# Patient Record
Sex: FEMALE | Race: WHITE | Employment: OTHER | URBAN - METROPOLITAN AREA
[De-identification: names, ages, dates, MRNs, and addresses within clinical notes are randomized per-mention and may not be internally consistent; named-entity substitution may affect disease eponyms.]

---

## 2021-08-26 ENCOUNTER — OFFICE VISIT (OUTPATIENT)
Dept: FAMILY MEDICINE CLINIC | Age: 61
End: 2021-08-26
Payer: COMMERCIAL

## 2021-08-26 VITALS
SYSTOLIC BLOOD PRESSURE: 119 MMHG | DIASTOLIC BLOOD PRESSURE: 81 MMHG | HEART RATE: 85 BPM | WEIGHT: 209.2 LBS | TEMPERATURE: 98.8 F | HEIGHT: 66 IN | BODY MASS INDEX: 33.62 KG/M2 | OXYGEN SATURATION: 96 %

## 2021-08-26 DIAGNOSIS — E55.9 VITAMIN D DEFICIENCY: ICD-10-CM

## 2021-08-26 DIAGNOSIS — R26.9 GAIT DIFFICULTY: ICD-10-CM

## 2021-08-26 DIAGNOSIS — Z12.31 SCREENING MAMMOGRAM, ENCOUNTER FOR: ICD-10-CM

## 2021-08-26 DIAGNOSIS — G89.29 CHRONIC LOW BACK PAIN, UNSPECIFIED BACK PAIN LATERALITY, UNSPECIFIED WHETHER SCIATICA PRESENT: Primary | ICD-10-CM

## 2021-08-26 DIAGNOSIS — Z13.29 SCREENING FOR THYROID DISORDER: ICD-10-CM

## 2021-08-26 DIAGNOSIS — Z11.59 NEED FOR HEPATITIS C SCREENING TEST: ICD-10-CM

## 2021-08-26 DIAGNOSIS — M54.50 CHRONIC LOW BACK PAIN, UNSPECIFIED BACK PAIN LATERALITY, UNSPECIFIED WHETHER SCIATICA PRESENT: Primary | ICD-10-CM

## 2021-08-26 DIAGNOSIS — R29.818 DIFFICULTY BALANCING: ICD-10-CM

## 2021-08-26 DIAGNOSIS — E53.9 VITAMIN B DEFICIENCY: ICD-10-CM

## 2021-08-26 DIAGNOSIS — Z11.4 ENCOUNTER FOR SCREENING FOR HIV: ICD-10-CM

## 2021-08-26 DIAGNOSIS — Z12.4 PAP SMEAR FOR CERVICAL CANCER SCREENING: ICD-10-CM

## 2021-08-26 DIAGNOSIS — R73.9 HYPERGLYCEMIA: ICD-10-CM

## 2021-08-26 DIAGNOSIS — E66.09 CLASS 1 OBESITY DUE TO EXCESS CALORIES WITH BODY MASS INDEX (BMI) OF 33.0 TO 33.9 IN ADULT, UNSPECIFIED WHETHER SERIOUS COMORBIDITY PRESENT: ICD-10-CM

## 2021-08-26 DIAGNOSIS — Z13.220 SCREENING, LIPID: ICD-10-CM

## 2021-08-26 PROCEDURE — 99204 OFFICE O/P NEW MOD 45 MIN: CPT | Performed by: FAMILY MEDICINE

## 2021-08-26 RX ORDER — HYDROCODONE BITARTRATE AND ACETAMINOPHEN 10; 325 MG/1; MG/1
1 TABLET ORAL EVERY 6 HOURS PRN
COMMUNITY
End: 2021-09-23

## 2021-08-26 RX ORDER — PREGABALIN 100 MG/1
100 CAPSULE ORAL 2 TIMES DAILY
COMMUNITY
End: 2021-09-21

## 2021-08-26 SDOH — ECONOMIC STABILITY: FOOD INSECURITY: WITHIN THE PAST 12 MONTHS, THE FOOD YOU BOUGHT JUST DIDN'T LAST AND YOU DIDN'T HAVE MONEY TO GET MORE.: NEVER TRUE

## 2021-08-26 SDOH — ECONOMIC STABILITY: FOOD INSECURITY: WITHIN THE PAST 12 MONTHS, YOU WORRIED THAT YOUR FOOD WOULD RUN OUT BEFORE YOU GOT MONEY TO BUY MORE.: NEVER TRUE

## 2021-08-26 ASSESSMENT — ENCOUNTER SYMPTOMS
DIARRHEA: 0
ANAL BLEEDING: 0
EYE DISCHARGE: 0
EYE REDNESS: 0
CHEST TIGHTNESS: 0
VOMITING: 0
ABDOMINAL DISTENTION: 0
VOICE CHANGE: 0
COLOR CHANGE: 0
SINUS PRESSURE: 0
CONSTIPATION: 0
TROUBLE SWALLOWING: 0
SHORTNESS OF BREATH: 0
BACK PAIN: 1
NAUSEA: 0
COUGH: 0
EYE PAIN: 0
RECTAL PAIN: 0
BLOOD IN STOOL: 0
ABDOMINAL PAIN: 0

## 2021-08-26 ASSESSMENT — PATIENT HEALTH QUESTIONNAIRE - PHQ9
SUM OF ALL RESPONSES TO PHQ QUESTIONS 1-9: 1
1. LITTLE INTEREST OR PLEASURE IN DOING THINGS: 0
SUM OF ALL RESPONSES TO PHQ QUESTIONS 1-9: 0
SUM OF ALL RESPONSES TO PHQ QUESTIONS 1-9: 1
1. LITTLE INTEREST OR PLEASURE IN DOING THINGS: 0
2. FEELING DOWN, DEPRESSED OR HOPELESS: 1
SUM OF ALL RESPONSES TO PHQ QUESTIONS 1-9: 0
SUM OF ALL RESPONSES TO PHQ9 QUESTIONS 1 & 2: 1
SUM OF ALL RESPONSES TO PHQ9 QUESTIONS 1 & 2: 0
SUM OF ALL RESPONSES TO PHQ QUESTIONS 1-9: 1
2. FEELING DOWN, DEPRESSED OR HOPELESS: 0
SUM OF ALL RESPONSES TO PHQ QUESTIONS 1-9: 0

## 2021-08-26 ASSESSMENT — SOCIAL DETERMINANTS OF HEALTH (SDOH): HOW HARD IS IT FOR YOU TO PAY FOR THE VERY BASICS LIKE FOOD, HOUSING, MEDICAL CARE, AND HEATING?: SOMEWHAT HARD

## 2021-08-26 NOTE — PROGRESS NOTES
Subjective:      Patient ID: Jose Raul Mcdowell is a 62 y.o. female. Had H/O back surgery - fusion was doing well, last year had a repeat surgery last year for chronic pain   due to worsening stenosis for her back in [place],   since then has had diff walking, incontinence of bladder Hailee at night, swelling in her legs, was seeing   Pain management and was prescribing her norco that she will run out of soon. States she has been having diff with gait and balance since her surgery - was using a walker   Initially but as she noticed increased diff with balance she started using a walker - has been using a walker   For over 1 year. Last fall was 2 weeks ago. States she has had falls at least once a month, usually goes down on one  Knee, usually walker gets in front of her and when she is tired she just drops. This last week she fell back wards. Mood has been ok, states family is supportive, older brother is here with you today. Appetite has been good . She states sleep has been ok as well. Bowels are regular. States her thyroid was checked a few times and a biopsy was done - was ok. 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 Had colonoscopy approx 5 years ago and was wNL  Review of Systems   Constitutional: Positive for activity change. Negative for appetite change and fatigue. HENT: Negative for dental problem, ear pain, hearing loss, postnasal drip, sinus pressure, sneezing, tinnitus, trouble swallowing and voice change. Eyes: Negative for pain, discharge, redness and visual disturbance. Respiratory: Negative for cough, chest tightness and shortness of breath. Cardiovascular: Negative for chest pain, palpitations and leg swelling. Gastrointestinal: Negative for abdominal distention, abdominal pain, anal bleeding, blood in stool, constipation, diarrhea, nausea, rectal pain and vomiting. Endocrine: Negative for cold intolerance, heat intolerance, polydipsia, polyphagia and polyuria.    Genitourinary: Negative for decreased urine volume, difficulty urinating, dyspareunia, dysuria, enuresis, flank pain, frequency, genital sores, hematuria, menstrual problem, pelvic pain, urgency, vaginal bleeding and vaginal discharge. Urinary incontinence     Musculoskeletal: Positive for back pain, gait problem and myalgias. Negative for arthralgias, joint swelling, neck pain and neck stiffness. Skin: Negative for color change, pallor and rash. Allergic/Immunologic: Negative for environmental allergies, food allergies and immunocompromised state. Neurological: Negative for dizziness, tremors, seizures, syncope, facial asymmetry, speech difficulty, weakness, light-headedness, numbness and headaches. Hematological: Negative for adenopathy. Does not bruise/bleed easily. Psychiatric/Behavioral: Negative for agitation, behavioral problems, confusion, decreased concentration, sleep disturbance and suicidal ideas. The patient is not nervous/anxious. Objective:   Physical Exam  Constitutional:       General: She is not in acute distress. Appearance: She is well-developed. HENT:      Head: Normocephalic and atraumatic. Right Ear: External ear normal.      Left Ear: External ear normal.   Eyes:      Conjunctiva/sclera: Conjunctivae normal.      Pupils: Pupils are equal, round, and reactive to light. Cardiovascular:      Rate and Rhythm: Normal rate and regular rhythm. Heart sounds: Normal heart sounds. Pulmonary:      Effort: Pulmonary effort is normal.      Breath sounds: Normal breath sounds. Abdominal:      General: Bowel sounds are normal. There is no distension. Palpations: Abdomen is soft. Tenderness: There is no abdominal tenderness. Musculoskeletal:      Cervical back: Normal range of motion and neck supple. Comments: Diff with gait- uses a walker for ambulation. Skin:     General: Skin is warm and dry.    Neurological:      Mental Status: She is alert and oriented to person, place, and time. Psychiatric:         Behavior: Behavior normal.         Thought Content: Thought content normal.         Judgment: Judgment normal.      B/ LE edema    Vitals:    08/26/21 1501   BP: 119/81   Pulse: 85   Temp: 98.8 °F (37.1 °C)   SpO2: 96%         Assessment:      Diagnosis Orders   1. Chronic low back pain, unspecified back pain laterality, unspecified whether sciatica present  [facility]   2. Gait difficulty  [facility] [place]   3. Difficulty balancing  [facility] [place]   4. Screening for thyroid disorder  T4, Free    TSH without Reflex   5. Screening, lipid  Comprehensive Metabolic Panel    Lipid Panel   6. Vitamin D deficiency  Vitamin D 25 Hydroxy   7. Vitamin B deficiency  CBC    Folate    Vitamin B12   8. Need for hepatitis C screening test  Hepatitis C Antibody   9. Hyperglycemia  Hemoglobin A1C   10. Encounter for screening for HIV  HIV Screen   11. Class 1 obesity due to excess calories with body mass index (BMI) of 33.0 to 33.9 in adult, unspecified whether serious comorbidity present  Insulin, Free   12. Pap smear for cervical cancer screening  [facility], Yang Morales CNM, OB/GYN, [place]   13. Screening mammogram, encounter for  SUZETTE DIGITAL SCREEN W OR WO CAD BILATERAL         Plan:     Orders Placed This Encounter   Procedures    SUZETTE DIGITAL SCREEN W OR WO CAD BILATERAL    CBC    Comprehensive Metabolic Panel    Folate    Hemoglobin A1C    Hepatitis C Antibody    HIV Screen    Lipid Panel    T4, Free    TSH without Reflex    Vitamin B12    Vitamin D 25 Hydroxy    Insulin, Free    [facility]   [facility], Yang Morales CNM, OB/GYN, [place]       Outpatient Encounter Medications as of 8/26/2021   Medication Sig Dispense Refill    HYDROcodone-acetaminophen (NORCO)  MG per tablet Take 1 tablet by mouth every 6 hours as needed for Pain.       pregabalin (LYRICA) 100 MG capsule Take 100 mg by mouth 2 times daily. No facility-administered encounter medications on file as of 8/26/2021.     15 minutes spent with patient counseling/educating on acute/chronic medical health problems, medications,  along with treatment options. Reviewed multiple labs/imaging/medications with patient during this time. Following Diet discussion/education was done on Diabetic Diet. In addition Exercise plan and depression screen were discussed. Recent labs/imaging were discussed and reviewed with patient.

## 2021-08-28 ENCOUNTER — HOSPITAL ENCOUNTER (OUTPATIENT)
Facility: CLINIC | Age: 61
Discharge: HOME | End: 2021-08-28
Payer: COMMERCIAL

## 2021-08-28 DIAGNOSIS — Z11.59 NEED FOR HEPATITIS C SCREENING TEST: ICD-10-CM

## 2021-08-28 DIAGNOSIS — Z11.4 ENCOUNTER FOR SCREENING FOR HIV: ICD-10-CM

## 2021-08-28 DIAGNOSIS — E66.09 CLASS 1 OBESITY DUE TO EXCESS CALORIES WITH BODY MASS INDEX (BMI) OF 33.0 TO 33.9 IN ADULT, UNSPECIFIED WHETHER SERIOUS COMORBIDITY PRESENT: ICD-10-CM

## 2021-08-28 DIAGNOSIS — E55.9 VITAMIN D DEFICIENCY: ICD-10-CM

## 2021-08-28 DIAGNOSIS — Z13.220 SCREENING, LIPID: ICD-10-CM

## 2021-08-28 DIAGNOSIS — R73.9 HYPERGLYCEMIA: ICD-10-CM

## 2021-08-28 DIAGNOSIS — Z13.29 SCREENING FOR THYROID DISORDER: ICD-10-CM

## 2021-08-28 DIAGNOSIS — E53.9 VITAMIN B DEFICIENCY: ICD-10-CM

## 2021-08-28 LAB
ALBUMIN SERPL-MCNC: 4.5 G/DL (ref 3.5–5.2)
ALBUMIN/GLOBULIN RATIO: 1.7 (ref 1–2.5)
ALP BLD-CCNC: 64 U/L (ref 35–104)
ALT SERPL-CCNC: 15 U/L (ref 5–33)
ANION GAP SERPL CALCULATED.3IONS-SCNC: 15 MMOL/L (ref 9–17)
AST SERPL-CCNC: 15 U/L
BILIRUB SERPL-MCNC: 0.36 MG/DL (ref 0.3–1.2)
BUN BLDV-MCNC: 12 MG/DL (ref 8–23)
BUN/CREAT BLD: ABNORMAL (ref 9–20)
CALCIUM SERPL-MCNC: 9.5 MG/DL (ref 8.6–10.4)
CHLORIDE BLD-SCNC: 104 MMOL/L (ref 98–107)
CHOLESTEROL/HDL RATIO: 5.1
CHOLESTEROL: 226 MG/DL
CO2: 23 MMOL/L (ref 20–31)
CREAT SERPL-MCNC: 0.51 MG/DL (ref 0.5–0.9)
FOLATE: 17.1 NG/ML
GFR AFRICAN AMERICAN: >60 ML/MIN
GFR NON-AFRICAN AMERICAN: >60 ML/MIN
GFR SERPL CREATININE-BSD FRML MDRD: ABNORMAL ML/MIN/{1.73_M2}
GFR SERPL CREATININE-BSD FRML MDRD: ABNORMAL ML/MIN/{1.73_M2}
GLUCOSE BLD-MCNC: 103 MG/DL (ref 70–99)
HCT VFR BLD CALC: 43.1 % (ref 36.3–47.1)
HDLC SERPL-MCNC: 44 MG/DL
HEMOGLOBIN: 13.8 G/DL (ref 11.9–15.1)
HEPATITIS C ANTIBODY: NONREACTIVE
HIV AG/AB: NONREACTIVE
LDL CHOLESTEROL: 123 MG/DL (ref 0–130)
MCH RBC QN AUTO: 29.4 PG (ref 25.2–33.5)
MCHC RBC AUTO-ENTMCNC: 32 G/DL (ref 28.4–34.8)
MCV RBC AUTO: 91.7 FL (ref 82.6–102.9)
NRBC AUTOMATED: 0 PER 100 WBC
PDW BLD-RTO: 12.7 % (ref 11.8–14.4)
PLATELET # BLD: 298 K/UL (ref 138–453)
PMV BLD AUTO: 10.3 FL (ref 8.1–13.5)
POTASSIUM SERPL-SCNC: 4.5 MMOL/L (ref 3.7–5.3)
RBC # BLD: 4.7 M/UL (ref 3.95–5.11)
SODIUM BLD-SCNC: 142 MMOL/L (ref 135–144)
THYROXINE, FREE: 1.18 NG/DL (ref 0.93–1.7)
TOTAL PROTEIN: 7.2 G/DL (ref 6.4–8.3)
TRIGL SERPL-MCNC: 293 MG/DL
TSH SERPL DL<=0.05 MIU/L-ACNC: 1.03 MIU/L (ref 0.3–5)
VITAMIN B-12: 313 PG/ML (ref 232–1245)
VITAMIN D 25-HYDROXY: 29.9 NG/ML (ref 30–100)
VLDLC SERPL CALC-MCNC: ABNORMAL MG/DL (ref 1–30)
WBC # BLD: 7 K/UL (ref 3.5–11.3)

## 2021-08-28 PROCEDURE — 82746 ASSAY OF FOLIC ACID SERUM: CPT

## 2021-08-28 PROCEDURE — 83036 HEMOGLOBIN GLYCOSYLATED A1C: CPT

## 2021-08-28 PROCEDURE — 82306 VITAMIN D 25 HYDROXY: CPT

## 2021-08-28 PROCEDURE — 80061 LIPID PANEL: CPT

## 2021-08-28 PROCEDURE — 84439 ASSAY OF FREE THYROXINE: CPT

## 2021-08-28 PROCEDURE — 82607 VITAMIN B-12: CPT

## 2021-08-28 PROCEDURE — 83527 ASSAY OF INSULIN: CPT

## 2021-08-28 PROCEDURE — 85027 COMPLETE CBC AUTOMATED: CPT

## 2021-08-28 PROCEDURE — 80053 COMPREHEN METABOLIC PANEL: CPT

## 2021-08-28 PROCEDURE — 84443 ASSAY THYROID STIM HORMONE: CPT

## 2021-08-28 PROCEDURE — 86803 HEPATITIS C AB TEST: CPT

## 2021-08-28 PROCEDURE — 36415 COLL VENOUS BLD VENIPUNCTURE: CPT

## 2021-08-28 PROCEDURE — 83525 ASSAY OF INSULIN: CPT

## 2021-08-28 PROCEDURE — 87389 HIV-1 AG W/HIV-1&-2 AB AG IA: CPT

## 2021-08-29 LAB
ESTIMATED AVERAGE GLUCOSE: 114 MG/DL
HBA1C MFR BLD: 5.6 % (ref 4–6)

## 2021-08-31 LAB
INSULIN FREE: 16 UIU/ML (ref 3–25)
INSULIN: 21 UIU/ML (ref 3–25)

## 2021-09-08 ENCOUNTER — INITIAL CONSULT (OUTPATIENT)
Dept: PAIN MANAGEMENT | Age: 61
End: 2021-09-08
Payer: COMMERCIAL

## 2021-09-08 VITALS
HEART RATE: 90 BPM | DIASTOLIC BLOOD PRESSURE: 75 MMHG | WEIGHT: 208 LBS | OXYGEN SATURATION: 94 % | SYSTOLIC BLOOD PRESSURE: 112 MMHG | HEIGHT: 68 IN | BODY MASS INDEX: 31.52 KG/M2

## 2021-09-08 DIAGNOSIS — R29.6 FREQUENT FALLS: ICD-10-CM

## 2021-09-08 DIAGNOSIS — M54.42 CHRONIC BILATERAL LOW BACK PAIN WITH LEFT-SIDED SCIATICA: Primary | ICD-10-CM

## 2021-09-08 DIAGNOSIS — R32 URINARY INCONTINENCE, UNSPECIFIED TYPE: ICD-10-CM

## 2021-09-08 DIAGNOSIS — Z98.1 HISTORY OF LUMBAR FUSION: ICD-10-CM

## 2021-09-08 DIAGNOSIS — R26.81 GAIT INSTABILITY: ICD-10-CM

## 2021-09-08 DIAGNOSIS — Z79.891 CHRONIC PRESCRIPTION OPIATE USE: ICD-10-CM

## 2021-09-08 DIAGNOSIS — G89.29 CHRONIC BILATERAL LOW BACK PAIN WITH LEFT-SIDED SCIATICA: Primary | ICD-10-CM

## 2021-09-08 PROCEDURE — 99244 OFF/OP CNSLTJ NEW/EST MOD 40: CPT | Performed by: ANESTHESIOLOGY

## 2021-09-08 RX ORDER — PREGABALIN 100 MG/1
100 CAPSULE ORAL 2 TIMES DAILY
Qty: 60 CAPSULE | Refills: 0 | Status: SHIPPED | OUTPATIENT
Start: 2021-09-08 | End: 2021-09-28 | Stop reason: SDUPTHER

## 2021-09-08 ASSESSMENT — ENCOUNTER SYMPTOMS
BACK PAIN: 1
GASTROINTESTINAL NEGATIVE: 1
RESPIRATORY NEGATIVE: 1
EYES NEGATIVE: 1
ALLERGIC/IMMUNOLOGIC NEGATIVE: 1
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
## 2021-09-14 ENCOUNTER — HOSPITAL ENCOUNTER (OUTPATIENT)
Age: 61
Discharge: HOME | End: 2021-09-16
Payer: COMMERCIAL

## 2021-09-14 ENCOUNTER — HOSPITAL ENCOUNTER (OUTPATIENT)
Dept: GENERAL RADIOLOGY | Age: 61
Discharge: HOME | End: 2021-09-16
Payer: COMMERCIAL

## 2021-09-14 DIAGNOSIS — G89.29 CHRONIC BILATERAL LOW BACK PAIN WITH LEFT-SIDED SCIATICA: ICD-10-CM

## 2021-09-14 DIAGNOSIS — Z98.1 HISTORY OF LUMBAR FUSION: ICD-10-CM

## 2021-09-14 DIAGNOSIS — R26.81 GAIT INSTABILITY: ICD-10-CM

## 2021-09-14 DIAGNOSIS — M54.42 CHRONIC BILATERAL LOW BACK PAIN WITH LEFT-SIDED SCIATICA: ICD-10-CM

## 2021-09-14 DIAGNOSIS — R32 URINARY INCONTINENCE, UNSPECIFIED TYPE: ICD-10-CM

## 2021-09-14 PROCEDURE — 72114 X-RAY EXAM L-S SPINE BENDING: CPT

## 2021-09-21 ENCOUNTER — OFFICE VISIT (OUTPATIENT)
Dept: PAIN MANAGEMENT | Age: 61
End: 2021-09-21
Payer: COMMERCIAL

## 2021-09-21 VITALS
SYSTOLIC BLOOD PRESSURE: 117 MMHG | BODY MASS INDEX: 31.52 KG/M2 | OXYGEN SATURATION: 94 % | HEIGHT: 68 IN | DIASTOLIC BLOOD PRESSURE: 73 MMHG | HEART RATE: 84 BPM | WEIGHT: 208 LBS

## 2021-09-21 DIAGNOSIS — M54.42 CHRONIC BILATERAL LOW BACK PAIN WITH LEFT-SIDED SCIATICA: ICD-10-CM

## 2021-09-21 DIAGNOSIS — R26.81 GAIT INSTABILITY: Primary | ICD-10-CM

## 2021-09-21 DIAGNOSIS — G89.29 CHRONIC BILATERAL LOW BACK PAIN WITH LEFT-SIDED SCIATICA: ICD-10-CM

## 2021-09-21 DIAGNOSIS — Z98.1 HISTORY OF LUMBAR FUSION: ICD-10-CM

## 2021-09-21 PROCEDURE — 99213 OFFICE O/P EST LOW 20 MIN: CPT | Performed by: ANESTHESIOLOGY

## 2021-09-21 ASSESSMENT — ENCOUNTER SYMPTOMS
BACK PAIN: 1
RESPIRATORY NEGATIVE: 1

## 2021-09-21 NOTE — PROGRESS NOTES
The patient is a 62 y. o. Non- / non  female. Chief Complaint   Patient presents with    Back Pain        HPI     Patient was seen 2 weeks back  History of back surgery  Developed severe back leg pain with gait instability and a urinary incontinence  Diagnostic work-up was ordered with MRI lumbar spine with and without contrast    Patient today here for follow-up states that she was not able to complete the work-up because of insurance issues  Did x-rays lumbar spine    Pt presents to review her x-rays 09/14/21. Pt states she could not get her MRI done because insurance will not pay for it. Pt states her pain is 5/10 today. Pt states her pain is intermittent. Aggravating factors are stair climbing, sitting, standing and movement. Alleviating factors are lying down.         Past Medical History:   Diagnosis Date    Anxiety     Fall     History of back surgery     L3-4 Fusion in 1980 and unk back surgery 2020    Neuropathy     Urinary incontinence         Past Surgical History:   Procedure Laterality Date    BACK SURGERY      L3-4 Fusion in 1980 and unk back surgery 2020    CHOLECYSTECTOMY, LAPAROSCOPIC      HYSTERECTOMY, TOTAL ABDOMINAL         Social History     Socioeconomic History    Marital status:      Spouse name: None    Number of children: None    Years of education: None    Highest education level: None   Occupational History    None   Tobacco Use    Smoking status: Never Smoker    Smokeless tobacco: Never Used   Substance and Sexual Activity    Alcohol use: Not Currently    Drug use: Not Currently    Sexual activity: None   Other Topics Concern    None   Social History Narrative    None     Social Determinants of Health     Financial Resource Strain: Medium Risk    Difficulty of Paying Living Expenses: Somewhat hard   Food Insecurity: No Food Insecurity    Worried About Running Out of Food in the Last Year: Never true    Baljit of Food in the Last Year: Never true   Transportation Needs:     Lack of Transportation (Medical):  Lack of Transportation (Non-Medical):    Physical Activity:     Days of Exercise per Week:     Minutes of Exercise per Session:    Stress:     Feeling of Stress :    Social Connections:     Frequency of Communication with Friends and Family:     Frequency of Social Gatherings with Friends and Family:     Attends Hindu Services:     Active Member of Clubs or Organizations:     Attends Club or Organization Meetings:     Marital Status:    Intimate Partner Violence:     Fear of Current or Ex-Partner:     Emotionally Abused:     Physically Abused:     Sexually Abused:        No family history on file. No Known Allergies    Vitals:    09/21/21 1017   BP: 117/73   Pulse: 84   SpO2: 94%       Current Outpatient Medications   Medication Sig Dispense Refill    pregabalin (LYRICA) 100 MG capsule Take 1 capsule by mouth 2 times daily for 30 days. 60 capsule 0    HYDROcodone-acetaminophen (NORCO)  MG per tablet Take 1 tablet by mouth every 6 hours as needed for Pain. 5/325 mg tablets (Patient not taking: Reported on 9/21/2021)       No current facility-administered medications for this visit. Review of Systems   Constitutional: Positive for fatigue. Respiratory: Negative. Musculoskeletal: Positive for back pain, gait problem and myalgias. Neurological: Positive for weakness. Objective:  General Appearance:  Well-appearing and in no acute distress. Vital signs: (most recent): Blood pressure 117/73, pulse 84, height 5' 8\" (1.727 m), weight 208 lb (94.3 kg), SpO2 94 %. Output: Producing urine and producing stool. HEENT: (No visible masses in neck  Range of motion appears normal on cervical spine  External ears appears normal)    Lungs:  Normal effort. She is not in respiratory distress.     Neurological: Patient is alert and oriented to person, place and time.  (Able to follow command    Psych  Mood is good  Affect is normal). Skin:  No rash or cyanosis. Assessment & Plan     1. Gait instability    2. Chronic bilateral low back pain with left-sided sciatica    3.  History of lumbar fusion        X-ray lumbar spine showed stable fusion at L4 level  No interim changes in health history  Advised patient to discuss with insurance about her care plan  She is a [place] resident and states that her insurance is not allowing nonemergency care in [place]  She has no plan to return to [place] from [facility]  I recommended her to discuss with her family if she want todiscuss with her family if she want to switch her permanent resident to [place] to fix the insurance dilemma    I am unable to make any clear assessment of her pain etiology or recommend any recommendation without having diagnostic work-up at this point      Electronically signed by Evon Lopez MD on 9/21/2021 at 10:37 AM

## 2021-09-23 ENCOUNTER — OFFICE VISIT (OUTPATIENT)
Dept: FAMILY MEDICINE CLINIC | Age: 61
End: 2021-09-23
Payer: COMMERCIAL

## 2021-09-23 VITALS
DIASTOLIC BLOOD PRESSURE: 78 MMHG | OXYGEN SATURATION: 96 % | BODY MASS INDEX: 30.77 KG/M2 | WEIGHT: 203 LBS | SYSTOLIC BLOOD PRESSURE: 110 MMHG | HEIGHT: 68 IN | HEART RATE: 84 BPM | TEMPERATURE: 97.4 F

## 2021-09-23 DIAGNOSIS — M54.42 CHRONIC BILATERAL LOW BACK PAIN WITH LEFT-SIDED SCIATICA: ICD-10-CM

## 2021-09-23 DIAGNOSIS — G89.4 CHRONIC PAIN SYNDROME: ICD-10-CM

## 2021-09-23 DIAGNOSIS — Z98.1 HISTORY OF LUMBAR FUSION: ICD-10-CM

## 2021-09-23 DIAGNOSIS — E53.8 VITAMIN B 12 DEFICIENCY: ICD-10-CM

## 2021-09-23 DIAGNOSIS — F43.21 ADJUSTMENT DISORDER WITH DEPRESSED MOOD: ICD-10-CM

## 2021-09-23 DIAGNOSIS — Z91.81 HISTORY OF FALLING: ICD-10-CM

## 2021-09-23 DIAGNOSIS — G89.29 CHRONIC BILATERAL LOW BACK PAIN WITH LEFT-SIDED SCIATICA: ICD-10-CM

## 2021-09-23 DIAGNOSIS — E55.9 VITAMIN D DEFICIENCY: Primary | ICD-10-CM

## 2021-09-23 DIAGNOSIS — E78.5 DYSLIPIDEMIA: ICD-10-CM

## 2021-09-23 DIAGNOSIS — R30.0 DYSURIA: ICD-10-CM

## 2021-09-23 DIAGNOSIS — Z98.890 HISTORY OF BACK SURGERY: ICD-10-CM

## 2021-09-23 PROBLEM — Z79.891 CHRONIC PRESCRIPTION OPIATE USE: Status: RESOLVED | Noted: 2021-09-08 | Resolved: 2021-09-23

## 2021-09-23 PROCEDURE — 99214 OFFICE O/P EST MOD 30 MIN: CPT | Performed by: FAMILY MEDICINE

## 2021-09-23 RX ORDER — DULOXETIN HYDROCHLORIDE 20 MG/1
20 CAPSULE, DELAYED RELEASE ORAL DAILY
Qty: 30 CAPSULE | Refills: 0 | Status: SHIPPED | OUTPATIENT
Start: 2021-09-23 | End: 2021-10-18

## 2021-09-23 RX ORDER — ACETAMINOPHEN 160 MG
1 TABLET,DISINTEGRATING ORAL DAILY
Qty: 90 CAPSULE | Refills: 3 | Status: SHIPPED | OUTPATIENT
Start: 2021-09-23

## 2021-09-23 RX ORDER — MAGNESIUM 200 MG
1 TABLET ORAL DAILY
Qty: 90 TABLET | Refills: 5 | Status: SHIPPED | OUTPATIENT
Start: 2021-09-23

## 2021-09-23 ASSESSMENT — ENCOUNTER SYMPTOMS
COLOR CHANGE: 0
CONSTIPATION: 0
TROUBLE SWALLOWING: 0
CHEST TIGHTNESS: 0
RECTAL PAIN: 0
BLOOD IN STOOL: 0
EYE DISCHARGE: 0
ABDOMINAL DISTENTION: 0
COUGH: 0
EYE PAIN: 0
ABDOMINAL PAIN: 0
VOMITING: 0
EYE REDNESS: 0
BACK PAIN: 0
SINUS PRESSURE: 0
SHORTNESS OF BREATH: 0
ANAL BLEEDING: 0
NAUSEA: 0
VOICE CHANGE: 0
DIARRHEA: 0

## 2021-09-23 NOTE — PATIENT INSTRUCTIONS
Patient Education        Learning About High Cholesterol  What is high cholesterol? High cholesterol means that you have too much cholesterol in your blood. Cholesterol is a type of fat. It's needed for many body functions, such as making new cells. Cholesterol is made by your body. It also comes from food you eat. Having high cholesterol can lead to the buildup of plaque in artery walls. This can increase your risk of heart attack and stroke. When your doctor talks about high cholesterol levels, your doctor is talking about your total cholesterol and LDL cholesterol (the \"bad\" cholesterol) levels. Your doctor may also speak about HDL (the \"good\" cholesterol) levels. High HDL is linked with a lower risk for coronary artery disease, heart attack, and stroke. Your cholesterol levels help your doctor find out your risk for having a heart attack or stroke. How can you help prevent high cholesterol? A heart-healthy lifestyle can help you prevent high cholesterol and lower your risk for a heart attack and stroke. · Eat heart-healthy foods. ? Eat fruits, vegetables, whole grains, beans, and other high-fiber foods. ? Eat lean proteins, such as seafood, lean meats, beans, nuts, and soy products. ? Eat healthy fats, such as canola and olive oil. ? Choose foods that are low in saturated fat. ? Limit sodium and alcohol. ? Limit drinks and foods with added sugar. · Be active. Try to do moderate activity at least 2½ hours a week. Or try vigorous activity at least 1¼ hours a week. You may want to walk or try other activities, such as running, swimming, cycling, or playing tennis or team sports. · Stay at a healthy weight. Lose weight if you need to. · Don't smoke. If you need help quitting, talk to your doctor about stop-smoking programs and medicines. These can increase your chances of quitting for good. How is high cholesterol treated?   The goal of treatment is to reduce your chances of having a heart attack

## 2021-09-23 NOTE — PROGRESS NOTES
Subjective:      Patient ID: Sara Rosenthal is a 62 y.o. female. States here for follow up of her chronic pain, weakness LE as well as recent labs completed. Her vit D and B12 levels are lower. States Chol is high but now started eating healthy and would like to try diet first.  Mood is low at times, sleep, appetite ok. Bowels are regular. Saw PM- states happy with visit. States has H/O depression bout a while ago. States tried SSRIs in the past and did not tolerate them well. Some mood changes due to her physical disabilities. States brothers and mom doing her work for her, she cannot take care of  Her home. States urine smells strong- not just concentrated- would like it checked. States not exercising much but states her insurance will not pay for PT as it is from [employer]- trying to get new insurance. Review of Systems   Constitutional: Negative for activity change, appetite change and fatigue. HENT: Negative for dental problem, ear pain, hearing loss, postnasal drip, sinus pressure, sneezing, tinnitus, trouble swallowing and voice change. Eyes: Negative for pain, discharge, redness and visual disturbance. Respiratory: Negative for cough, chest tightness and shortness of breath. Cardiovascular: Negative for chest pain, palpitations and leg swelling. Gastrointestinal: Negative for abdominal distention, abdominal pain, anal bleeding, blood in stool, constipation, diarrhea, nausea, rectal pain and vomiting. Endocrine: Negative for cold intolerance, heat intolerance, polydipsia, polyphagia and polyuria. Genitourinary: Negative for decreased urine volume, difficulty urinating, dyspareunia, dysuria, enuresis, flank pain, frequency, genital sores, hematuria, menstrual problem, pelvic pain, urgency, vaginal bleeding and vaginal discharge. Musculoskeletal: Positive for arthralgias, gait problem and myalgias. Negative for back pain, joint swelling, neck pain and neck stiffness.    Skin: Negative for color change, pallor and rash. Allergic/Immunologic: Negative for environmental allergies, food allergies and immunocompromised state. Neurological: Negative for dizziness, tremors, seizures, syncope, facial asymmetry, speech difficulty, weakness, light-headedness, numbness and headaches. Hematological: Negative for adenopathy. Does not bruise/bleed easily. Psychiatric/Behavioral: Negative for agitation, behavioral problems, confusion, decreased concentration, sleep disturbance and suicidal ideas. The patient is not nervous/anxious. Objective:   Physical Exam  Constitutional:       General: She is not in acute distress. HENT:      Head: Normocephalic and atraumatic. Right Ear: External ear normal.      Left Ear: External ear normal.      Nose: Nose normal.      Mouth/Throat:      Mouth: Mucous membranes are moist.   Eyes:      Conjunctiva/sclera: Conjunctivae normal.      Pupils: Pupils are equal, round, and reactive to light. Neck:      Thyroid: No thyromegaly. Trachea: No tracheal deviation. Cardiovascular:      Rate and Rhythm: Normal rate and regular rhythm. Pulses: Normal pulses. Heart sounds: Normal heart sounds. No murmur heard. No friction rub. No gallop. Pulmonary:      Effort: Pulmonary effort is normal. No respiratory distress. Breath sounds: Normal breath sounds. No stridor. No wheezing or rales. Chest:      Chest wall: No tenderness. Abdominal:      General: Bowel sounds are normal. There is no distension. Palpations: Abdomen is soft. Tenderness: There is no abdominal tenderness. There is no rebound. Musculoskeletal:         General: Normal range of motion. Cervical back: Normal range of motion. Comments: Ambulates with walker   Lymphadenopathy:      Cervical: No cervical adenopathy. Skin:     General: Skin is warm. Coloration: Skin is not pale. Findings: No erythema or rash.    Neurological: General: No focal deficit present. Mental Status: She is alert and oriented to person, place, and time. Mental status is at baseline. Cranial Nerves: No cranial nerve deficit. Motor: No abnormal muscle tone. Deep Tendon Reflexes: Reflexes normal.   Psychiatric:         Behavior: Behavior normal.         Thought Content: Thought content normal.         Judgment: Judgment normal.          Vitals:    09/23/21 0955   BP: 110/78   Pulse: 84   Temp: 97.4 °F (36.3 °C)   SpO2: 96%         Assessment:       Diagnosis Orders   1. Vitamin D deficiency  Cholecalciferol (VITAMIN D3) 50 MCG (2000 UT) CAPS   2. Vitamin B 12 deficiency  Cyanocobalamin (B-12) 1000 MCG SUBL   3. Dyslipidemia     4. Chronic pain syndrome  DULoxetine (CYMBALTA) 20 MG extended release capsule   5. History of falling     6. Chronic bilateral low back pain with left-sided sciatica     7. History of lumbar fusion     8. Adjustment disorder with depressed mood     9. History of back surgery           Plan:     No orders of the defined types were placed in this encounter. Outpatient Encounter Medications as of 9/23/2021   Medication Sig Dispense Refill    Cyanocobalamin (B-12) 1000 MCG SUBL Place 1 tablet under the tongue daily 90 tablet 5    Cholecalciferol (VITAMIN D3) 50 MCG (2000 UT) CAPS Take 1 capsule by mouth daily 90 capsule 3    DULoxetine (CYMBALTA) 20 MG extended release capsule Take 1 capsule by mouth daily 30 capsule 0    pregabalin (LYRICA) 100 MG capsule Take 1 capsule by mouth 2 times daily for 30 days. 60 capsule 0    [DISCONTINUED] HYDROcodone-acetaminophen (NORCO)  MG per tablet Take 1 tablet by mouth every 6 hours as needed for Pain.  5/325 mg tablets (Patient not taking: Reported on 9/23/2021)       No facility-administered encounter medications on file as of 9/23/2021.     15 minutes spent with patient counseling/educating on acute/chronic medical health problems, medications,  along with treatment options. Reviewed multiple labs/imaging/medications with patient during this time. Following Diet discussion/education was done on Cholesterol Diet . In addition Exercise plan and depression screen were discussed. Recent labs/imaging were discussed and reviewed with patient.

## 2021-09-28 ENCOUNTER — TELEPHONE (OUTPATIENT)
Dept: PAIN MANAGEMENT | Age: 61
End: 2021-09-28

## 2021-09-28 DIAGNOSIS — M54.42 CHRONIC BILATERAL LOW BACK PAIN WITH LEFT-SIDED SCIATICA: ICD-10-CM

## 2021-09-28 DIAGNOSIS — G89.29 CHRONIC BILATERAL LOW BACK PAIN WITH LEFT-SIDED SCIATICA: ICD-10-CM

## 2021-09-28 DIAGNOSIS — Z98.1 HISTORY OF LUMBAR FUSION: ICD-10-CM

## 2021-09-28 NOTE — TELEPHONE ENCOUNTER
Pt states she cannot tolerate the generic of Lyrica and pharmacy dispensed generic. Pt needs new Rx sent in for Lyrica with instructions to fill only Brand name.

## 2021-10-11 ENCOUNTER — TELEPHONE (OUTPATIENT)
Dept: FAMILY MEDICINE CLINIC | Age: 61
End: 2021-10-11

## 2021-10-11 NOTE — TELEPHONE ENCOUNTER
Spoke with pt about her referral and she stated that she has had a appt with the pain management already

## 2021-10-12 ENCOUNTER — TELEPHONE (OUTPATIENT)
Dept: PAIN MANAGEMENT | Age: 61
End: 2021-10-12

## 2021-10-12 NOTE — TELEPHONE ENCOUNTER
Called pt to see if she wanted to reschedule her missed appointment, pt stated she will call back tmrw.

## 2021-10-14 ENCOUNTER — HOSPITAL ENCOUNTER (OUTPATIENT)
Dept: MAMMOGRAPHY | Age: 61
Discharge: HOME | End: 2021-10-16
Payer: COMMERCIAL

## 2021-10-14 DIAGNOSIS — Z12.31 SCREENING MAMMOGRAM, ENCOUNTER FOR: ICD-10-CM

## 2021-10-14 PROCEDURE — 77063 BREAST TOMOSYNTHESIS BI: CPT

## 2021-10-17 DIAGNOSIS — G89.4 CHRONIC PAIN SYNDROME: ICD-10-CM

## 2021-10-18 RX ORDER — DULOXETIN HYDROCHLORIDE 20 MG/1
CAPSULE, DELAYED RELEASE ORAL
Qty: 30 CAPSULE | Refills: 0 | Status: SHIPPED | OUTPATIENT
Start: 2021-10-18 | End: 2021-11-01

## 2021-10-19 ENCOUNTER — OFFICE VISIT (OUTPATIENT)
Dept: OBGYN CLINIC | Age: 61
End: 2021-10-19
Payer: COMMERCIAL

## 2021-10-19 VITALS
DIASTOLIC BLOOD PRESSURE: 82 MMHG | SYSTOLIC BLOOD PRESSURE: 122 MMHG | WEIGHT: 180 LBS | BODY MASS INDEX: 27.28 KG/M2 | HEIGHT: 68 IN

## 2021-10-19 DIAGNOSIS — Z12.31 VISIT FOR SCREENING MAMMOGRAM: ICD-10-CM

## 2021-10-19 DIAGNOSIS — Z01.419 ENCOUNTER FOR GYNECOLOGICAL EXAMINATION WITHOUT ABNORMAL FINDING: Primary | ICD-10-CM

## 2021-10-19 PROCEDURE — 99386 PREV VISIT NEW AGE 40-64: CPT | Performed by: OBSTETRICS & GYNECOLOGY

## 2021-10-19 NOTE — PATIENT INSTRUCTIONS
Please get your mammogram done at your convenience. We will contact you with that result as well as today's Pap. Return to the office in 1 year or as needed. Have a safe and healthy year!

## 2021-10-19 NOTE — PROGRESS NOTES
DATE OF VISIT:  10/19/21        History and Physical    Rivas Polo    :  1960  CHIEF COMPLAINT:    Chief Complaint   Patient presents with    Established New Doctor     new patient here for annual last pap 3 years ago no abnormal papsmears    Last mammogram 10/14/21 neg                     HPI :   Rivas Polo is a 62 y.o. femaleGRAVIDA 1 PARA 1001    Rivas Polo comes to the office today as a new visit referral from Dr. Sang Callahan. She is here today for her annual exam.  Lower his last GYN exam was approximately 3 years ago. Due to what sounds like multiple traumas she has had a lumbar fusion and has difficulty ambulating. She also had a hysterectomy with cervical sparing for abnormal bleeding. She is having regular bowel movements without constipation or diarrhea. She does suffer from nocturia but denies any other episodes of involuntary loss of urine. She is otherwise without any significant complaints today. Mildred Patino had a recent mammogram that was negative.  _____________________________________________________________________  Past Medical History:   Diagnosis Date    Chronic prescription opiate use 2021    History of back surgery     L3-4 Fusion in  and unk back surgery     Neuropathy     Urinary incontinence                                                                    Past Surgical History:   Procedure Laterality Date    BACK SURGERY      L3-4 Fusion in  and unk back surgery     CHOLECYSTECTOMY, LAPAROSCOPIC      HYSTERECTOMY, TOTAL ABDOMINAL       Family History   Problem Relation Age of Onset    Breast Cancer Mother 79     Social History     Tobacco Use   Smoking Status Never Smoker   Smokeless Tobacco Never Used     Social History     Substance and Sexual Activity   Alcohol Use Not Currently     Current Outpatient Medications   Medication Sig Dispense Refill    LYRICA 100 MG capsule Take 1 capsule by mouth 2 times daily for 30 days.  60 capsule 0    Cyanocobalamin (B-12) 1000 MCG SUBL Place 1 tablet under the tongue daily 90 tablet 5    Cholecalciferol (VITAMIN D3) 50 MCG (2000 UT) CAPS Take 1 capsule by mouth daily 90 capsule 3    DULoxetine (CYMBALTA) 20 MG extended release capsule take 1 capsule by mouth once daily (Patient not taking: Reported on 10/19/2021) 30 capsule 0     No current facility-administered medications for this visit. Allergies:  No Known Allergies    Gynecologic History:  No LMP recorded. Patient has had a hysterectomy. Sexually Active: No  STD History: Yes, HSV  Abnormal Pap History no  Birth Control: No    OB History    Para Term  AB Living   1 1 1 0 0 0   SAB TAB Ectopic Molar Multiple Live Births   0 0 0 0 0 0     ______________________________________________________________________  REVIEW OF SYSTEMS:        Constitutional:  Unexpected weight change no  Neurological:  Frequent headaches  no  Ophthalmic:  Recent visual changes no  ENT:   Difficulty swallowing  no  Breast:              Masses   no     Respiratory:  Shortness of breath  no    Cardiovascular: Chest pain   no     Gastrointestinal: Chronic diarrhea/constipation no   Urogenital:  Urinary incontinence  no                                         Heavy/irregular periods           no                                      Vaginal discharge                   no  Hematological: Bruises easy   no     Endocrine:  Nipple Discharge  no     Hot/Cold Intolerance  no   Psychological:  Mood and affect were wnl yes                                                                                                                                           Physical Exam:     Vitals:    10/19/21 1312   BP: 122/82     [unfilled]  Body mass index is 27.37 kg/m². General Appearance:  She does not appear to be in any distress. This  is a well developed, well nourished, well groomed female.         Neurological:  The patient is alert and oriented to time, place, person, and situation without any noted sensory motor deficits. Skin:  A brief inspection of the skin revealed no rashes or lesions. Neck:  The neck was supple. There is no tracheal deviation, thyromegaly or supraclavicular adenopathy appreciated. Respiratory: There was unlabored respiratory effort. Lungs clear to ascultation without wheezes, rales or rhonchi in all fields bilaterally. Cardiovascular:  Normal sinus rhythm with a regular rate and without murmur, rubs or gallops. Abdomen: The abdomen was soft and non-tender with no guarding, rebound, CVAT or rigidity. No hernias were appreciated. Bowel sounds were normally active. Pelvic exam:  No vulvar or vaginal lesions are noted. Cervix in place and grossly normal.  Normal vaginal discharge present, no significant cystocele, rectocele or enterocele noted. Vaginal vault is well supported. Uterus surgically absent and adnexa nontender and without abnormal masses bilaterally. Extremities:  FROM and nontender without clubbing cyanosis or edema. ASSESSMENT:        Diagnosis Orders   1. Encounter for gynecological examination without abnormal finding  PAP SMEAR   2. Visit for screening mammogram                     PLAN:    Return to the office in 1 year or when necessary    Liang Richardson M.D., [address] [facility] [address]  [place]

## 2021-10-25 DIAGNOSIS — Z01.419 ENCOUNTER FOR GYNECOLOGICAL EXAMINATION WITHOUT ABNORMAL FINDING: ICD-10-CM

## 2021-11-01 ENCOUNTER — OFFICE VISIT (OUTPATIENT)
Dept: FAMILY MEDICINE CLINIC | Age: 61
End: 2021-11-01
Payer: COMMERCIAL

## 2021-11-01 VITALS
BODY MASS INDEX: 31.1 KG/M2 | TEMPERATURE: 97.2 F | SYSTOLIC BLOOD PRESSURE: 108 MMHG | HEART RATE: 81 BPM | HEIGHT: 68 IN | WEIGHT: 205.2 LBS | DIASTOLIC BLOOD PRESSURE: 71 MMHG | OXYGEN SATURATION: 96 %

## 2021-11-01 DIAGNOSIS — F43.21 ADJUSTMENT DISORDER WITH DEPRESSED MOOD: ICD-10-CM

## 2021-11-01 DIAGNOSIS — Z12.11 SCREEN FOR COLON CANCER: ICD-10-CM

## 2021-11-01 DIAGNOSIS — G89.29 CHRONIC BILATERAL LOW BACK PAIN WITH LEFT-SIDED SCIATICA: ICD-10-CM

## 2021-11-01 DIAGNOSIS — R32 URINARY INCONTINENCE, UNSPECIFIED TYPE: ICD-10-CM

## 2021-11-01 DIAGNOSIS — M54.42 CHRONIC BILATERAL LOW BACK PAIN WITH LEFT-SIDED SCIATICA: ICD-10-CM

## 2021-11-01 DIAGNOSIS — G89.29 CHRONIC LOW BACK PAIN, UNSPECIFIED BACK PAIN LATERALITY, UNSPECIFIED WHETHER SCIATICA PRESENT: ICD-10-CM

## 2021-11-01 DIAGNOSIS — M54.50 CHRONIC LOW BACK PAIN, UNSPECIFIED BACK PAIN LATERALITY, UNSPECIFIED WHETHER SCIATICA PRESENT: ICD-10-CM

## 2021-11-01 DIAGNOSIS — Z91.81 HISTORY OF FALLING: ICD-10-CM

## 2021-11-01 DIAGNOSIS — E78.5 DYSLIPIDEMIA: ICD-10-CM

## 2021-11-01 DIAGNOSIS — R26.9 GAIT DIFFICULTY: Primary | ICD-10-CM

## 2021-11-01 DIAGNOSIS — E53.8 B12 DEFICIENCY: ICD-10-CM

## 2021-11-01 DIAGNOSIS — E55.9 VITAMIN D DEFICIENCY: ICD-10-CM

## 2021-11-01 PROCEDURE — 99214 OFFICE O/P EST MOD 30 MIN: CPT | Performed by: FAMILY MEDICINE

## 2021-11-01 RX ORDER — OXYBUTYNIN CHLORIDE 5 MG/1
TABLET ORAL
Qty: 60 TABLET | Refills: 3 | Status: CANCELLED | OUTPATIENT
Start: 2021-11-01

## 2021-11-01 ASSESSMENT — ENCOUNTER SYMPTOMS
DIARRHEA: 0
CONSTIPATION: 0
VOMITING: 0
ABDOMINAL PAIN: 0
SHORTNESS OF BREATH: 0
BLOOD IN STOOL: 0
BACK PAIN: 0
VOICE CHANGE: 0
GASTROINTESTINAL NEGATIVE: 1
CHEST TIGHTNESS: 0
ANAL BLEEDING: 0
EYE PAIN: 0
EYES NEGATIVE: 1
RESPIRATORY NEGATIVE: 1
TROUBLE SWALLOWING: 0
RECTAL PAIN: 0
COUGH: 0
ALLERGIC/IMMUNOLOGIC NEGATIVE: 1
EYE DISCHARGE: 0
EYE REDNESS: 0
SINUS PRESSURE: 0
ABDOMINAL DISTENTION: 0
COLOR CHANGE: 0
NAUSEA: 0

## 2021-11-01 NOTE — PROGRESS NOTES
Subjective:      Patient ID: Yaya Collier is a 62 y.o. female. Saw Gyn and had pap completed- it was WNL. Continues to have incontinence at night - states incontinent at night   States saw Dr Reg Schmidt for her chronic pain and states she has been continued for the lyrica but stopped the norco.  States stopped cymbalta after day 1 as she has multiple episodes of vomiting. States colonoscopy was done a few years ago - may be close to 10 years  Taking 15 and Vit D daily. States would like to try PT now though not sure how the coverage is going to be. States feels memory has been poor - cant remember short term things-sometimes  long term memory is also affected.  feels she forgets things that happened last few years as well. Mood low and there is lack of motivation as well. States used to be very active- walking , hiking - and last 2 years she has not bene able to do this and so does feel down some . Review of Systems   Constitutional: Negative. Negative for activity change, appetite change and fatigue. HENT: Negative. Negative for dental problem, ear pain, hearing loss, postnasal drip, sinus pressure, sneezing, tinnitus, trouble swallowing and voice change. Eyes: Negative. Negative for pain, discharge, redness and visual disturbance. Respiratory: Negative. Negative for cough, chest tightness and shortness of breath. Cardiovascular: Negative. Negative for chest pain, palpitations and leg swelling. Gastrointestinal: Negative. Negative for abdominal distention, abdominal pain, anal bleeding, blood in stool, constipation, diarrhea, nausea, rectal pain and vomiting. Endocrine: Negative. Negative for cold intolerance, heat intolerance, polydipsia, polyphagia and polyuria. Genitourinary: Negative.   Negative for decreased urine volume, difficulty urinating, dyspareunia, dysuria, enuresis, flank pain, frequency, genital sores, hematuria, menstrual problem, pelvic pain, urgency, vaginal bleeding and vaginal discharge. Musculoskeletal: Negative. Negative for arthralgias, back pain, gait problem, joint swelling, myalgias, neck pain and neck stiffness. Skin: Negative. Negative for color change, pallor and rash. Allergic/Immunologic: Negative. Negative for environmental allergies, food allergies and immunocompromised state. Neurological: Negative. Negative for dizziness, tremors, seizures, syncope, facial asymmetry, speech difficulty, weakness, light-headedness, numbness and headaches. Hematological: Negative. Negative for adenopathy. Does not bruise/bleed easily. Psychiatric/Behavioral: Negative. Negative for agitation, behavioral problems, confusion, decreased concentration, sleep disturbance and suicidal ideas. The patient is not nervous/anxious. Objective:   Physical Exam  Constitutional:       General: She is not in acute distress. HENT:      Head: Normocephalic and atraumatic. Right Ear: External ear normal.      Left Ear: External ear normal.      Nose: Nose normal.      Mouth/Throat:      Mouth: Mucous membranes are moist.   Eyes:      Conjunctiva/sclera: Conjunctivae normal.      Pupils: Pupils are equal, round, and reactive to light. Neck:      Thyroid: No thyromegaly. Trachea: No tracheal deviation. Cardiovascular:      Rate and Rhythm: Normal rate and regular rhythm. Pulses: Normal pulses. Heart sounds: Normal heart sounds. No murmur heard. No friction rub. No gallop. Pulmonary:      Effort: Pulmonary effort is normal. No respiratory distress. Breath sounds: Normal breath sounds. No stridor. No wheezing or rales. Chest:      Chest wall: No tenderness. Abdominal:      General: Bowel sounds are normal. There is no distension. Palpations: Abdomen is soft. Tenderness: There is no abdominal tenderness. There is no rebound. Musculoskeletal:         General: Normal range of motion.       Cervical back: Normal range of motion. Lymphadenopathy:      Cervical: No cervical adenopathy. Skin:     General: Skin is warm. Coloration: Skin is not pale. Findings: No erythema or rash. Neurological:      General: No focal deficit present. Mental Status: She is alert and oriented to person, place, and time. Cranial Nerves: No cranial nerve deficit. Motor: No abnormal muscle tone. Deep Tendon Reflexes: Reflexes normal.   Psychiatric:         Mood and Affect: Mood normal.         Behavior: Behavior normal.         Thought Content: Thought content normal.         Judgment: Judgment normal.          Vitals:    11/01/21 1540   BP: 108/71   Pulse: 81   Temp: 97.2 °F (36.2 °C)   SpO2: 96%         Assessment:      Diagnosis Orders   1. Gait difficulty  [facility]   2. Vitamin D deficiency     3. B12 deficiency     4. Chronic low back pain, unspecified back pain laterality, unspecified whether sciatica present  [facility]   5. Chronic bilateral low back pain with left-sided sciatica     6. Dyslipidemia     7. History of falling     8. Adjustment disorder with depressed mood     9. Urinary incontinence, unspecified type  oxybutynin (DITROPAN) 5 MG tablet   10.  Screen for colon cancer  [facility], [address]         Plan:     Orders Placed This Encounter   Procedures   [address] [facility], [address]   Joya Zuniga MD, Urology, Arthur Felder MD, Psychiatry, [facility]       Outpatient Encounter Medications as of 11/1/2021   Medication Sig Dispense Refill    Cyanocobalamin (B-12) 1000 MCG SUBL Place 1 tablet under the tongue daily 90 tablet 5    Cholecalciferol (VITAMIN D3) 50 MCG (2000 UT) CAPS Take 1 capsule by mouth daily 90 capsule 3    [DISCONTINUED] DULoxetine (CYMBALTA) 20 MG extended release capsule take 1 capsule by mouth once daily (Patient not taking: Reported on 10/19/2021) 30 capsule 0    LYRICA 100 MG capsule Take 1 capsule by mouth 2 times daily for 30 days. 60 capsule 0     No facility-administered encounter medications on file as of 11/1/2021.     15 minutes spent with patient counseling/educating on acute/chronic medical health problems, medications,  along with treatment options. Reviewed multiple labs/imaging/medications with patient during this time. Following Diet discussion/education was done on healthy diet. In addition Exercise plan and depression screen were discussed. Recent labs/imaging were discussed and reviewed with patient.

## 2021-11-04 ENCOUNTER — HOSPITAL ENCOUNTER (OUTPATIENT)
Dept: PHYSICAL THERAPY | Facility: CLINIC | Age: 61
Setting detail: THERAPIES SERIES
Discharge: HOME | End: 2021-11-04

## 2021-11-04 NOTE — CONSULTS
[] [facility]        Outpatient Physical                Therapy       [address]       Phone: [phone]       Fax: [phone] [x] [facility]       Promotion at [address] [facility] [address]       Phone: [phone]       Fax: [phone] [] [facility]    [address]     Phone: [phone]     Fax:  [phone]     Physical Therapy Pelvic Floor Evaluation/ NO CHARGE    Date:  2021  Patient: Issac Chavez  : 1960  MRN: [id number]  Physician:   Melissa Garcia MD        Insurance: self pay  Medical Diagnosis:   R26.9 (ICD-10-CM) - Gait difficulty   M54.50, G89.29 (ICD-10-CM) - Chronic low back pain, unspecified back pain laterality, unspecified whether sciatica present     Onset Date: unsure,                                  Next 's appt: NA     Subjective:       Per chart review, patient has multiple red flags including gait instability and urinary incontinence at night time without waking. Pt reports that she sleeps \"very deep. \" She does report bowel continence and has sensation. Pt has multiple red flags and per Dr. Kristel Delatorre note requires additional imaging. Counseled pt regarding this. Valera Homans reports she has been getting certain things covered by her insurance at [address] [place] [address] and is confused as to why her MRI is not being covered. If patient is cleared of red flags, therapy will see patient.              Red Flag Screen:  Radicular pain: POSITIVE  Numbness/tingling groin: NEGATIVE   Bowel/bladder incontinence: POSITIVE   Leg weakness: POSITIVE   Falls: POSITIVE     PMHx:   Past Medical History:   Diagnosis Date    Chronic prescription opiate use 2021    History of back surgery     L3-4 Fusion in  and unk back surgery     Neuropathy     Urinary incontinence                 Medications:   Current Outpatient Medications on File Prior to Encounter   Medication Sig Dispense Refill    LYRICA 100 MG capsule Take 1 capsule by mouth 2 times daily for 30 days. 60 capsule 0    Cyanocobalamin (B-12) 1000 MCG SUBL Place 1 tablet under the tongue daily 90 tablet 5    Cholecalciferol (VITAMIN D3) 50 MCG (2000 UT) CAPS Take 1 capsule by mouth daily 90 capsule 3     No current facility-administered medications on file prior to encounter. Allergies: Allergies   Allergen Reactions    Cymbalta [Duloxetine Hcl]      vomiting             Tests: [x] X-Ray: see chart [x] MRI: ordered, but not completed:    Surgical    History of abdominal surgeries? [x] Yes      If yes, please list:    History of orthopedic surgeries? [x] Yes      If yes, please list:   Past Surgical History:   Procedure Laterality Date    BACK SURGERY      L3-4 Fusion in 1980 and unk back surgery 2020    CHOLECYSTECTOMY, LAPAROSCOPIC      HYSTERECTOMY, TOTAL ABDOMINAL           Treatment Charges: Mins Units   [] Evaluation(low complex)     [] Modalities     [] Ther Exercise     [] Manual Therapy     [] Ther Activities     [] Aquatics         [] Vasocompression         [x] Other NO CHARGE              TOTAL TREATMENT TIME: 20 min       Time in:  Time out:       Electronically signed by: Orlin Valdez PT     Orlin Valdez PT, DPT   License # [id number]       Physician Signature:________________________________Date:__________________  By signing above or cosigning this note, I have reviewed this plan of care and certify a need for medically necessary rehabilitation services.        *PLEASE SIGN ABOVE AND FAX BACK ALL PAGES*

## 2021-11-30 ENCOUNTER — OFFICE VISIT (OUTPATIENT)
Dept: PAIN MANAGEMENT | Age: 61
End: 2021-11-30
Payer: COMMERCIAL

## 2021-11-30 VITALS
DIASTOLIC BLOOD PRESSURE: 89 MMHG | WEIGHT: 205 LBS | HEIGHT: 68 IN | BODY MASS INDEX: 31.07 KG/M2 | OXYGEN SATURATION: 98 % | HEART RATE: 83 BPM | SYSTOLIC BLOOD PRESSURE: 126 MMHG

## 2021-11-30 DIAGNOSIS — M54.42 CHRONIC BILATERAL LOW BACK PAIN WITH LEFT-SIDED SCIATICA: Primary | ICD-10-CM

## 2021-11-30 DIAGNOSIS — Z98.1 HISTORY OF LUMBAR FUSION: ICD-10-CM

## 2021-11-30 DIAGNOSIS — R32 URINARY INCONTINENCE, UNSPECIFIED TYPE: ICD-10-CM

## 2021-11-30 DIAGNOSIS — G89.29 CHRONIC BILATERAL LOW BACK PAIN WITH LEFT-SIDED SCIATICA: Primary | ICD-10-CM

## 2021-11-30 PROCEDURE — 99213 OFFICE O/P EST LOW 20 MIN: CPT | Performed by: ANESTHESIOLOGY

## 2021-11-30 ASSESSMENT — ENCOUNTER SYMPTOMS
BACK PAIN: 1
EYES NEGATIVE: 1

## 2021-11-30 NOTE — PROGRESS NOTES
The patient is a 62 y. o. Non- / non  female. Chief Complaint   Patient presents with    Medication Refill    Gait Problem        HPI   -62year-old woman with complicated medical history  She moved from [place] to [place] recently  She had 2 previous back surgeries most recent was in Feb 2020  Postoperatively she developed severe low back left leg pain left leg weakness gait instability and urinary incontinence  Unfortunately no work-up has been done by her surgeon or anybody else  Physical therapy have advised to complete MRI imaging before proceeding any therapy  MRI has been denied by her insurance  X-ray did not show any hardware failure  She is currently taking Lyrica  She is able to ambulate only with a walker  Her balance and strength have deteriorated over last 18 months  I truly believe that the she needs EMG nerve testing neurological consultation MRI lumbar spine with and without contrast neurosurgical evaluation and then extensive rehab if no surgical pathology is found  Medication Refill: lyrica    Pain score Today:  5  Adverse effects (Constipation / Nausea / Sedation / sexual Dysfunction / others) : none  Mood: fair  Sleep pattern and quality: good  Activity level: fair    Pill count Today:pt out of med  Last dose taken  11/29/21  OARRS report reviewed today: yes  ER/Hospitalizations/PCP visit related to pain since last visit:no  Any legal problems e.g. DUI etc.:No  Satisfied with current management: Yes    Opioid Contract:None on file  Last Urine Dug screen dated:None on file      Lab Results   Component Value Date    LABA1C 5.6 08/28/2021     Lab Results   Component Value Date     08/28/2021       Past Medical History, Past Surgical History, Social History, Allergies and Medications reviewed and updated in EPIC as indicated    Family History reviewed and is noncontributory.             Past Medical History:   Diagnosis Date    Chronic prescription opiate use 9/8/2021    Pay for Housing in the Last Year: Not on file    Number of Places Lived in the Last Year: Not on file    Unstable Housing in the Last Year: Not on file       Family History   Problem Relation Age of Onset    Breast Cancer Mother 79       Allergies   Allergen Reactions    Cymbalta [Duloxetine Hcl]      vomiting       Vitals:    11/30/21 1528   BP: 126/89   Pulse: 83   SpO2: 98%       Current Outpatient Medications   Medication Sig Dispense Refill    LYRICA 100 MG capsule Take 1 capsule by mouth 2 times daily for 30 days. 60 capsule 1    Cyanocobalamin (B-12) 1000 MCG SUBL Place 1 tablet under the tongue daily 90 tablet 5    Cholecalciferol (VITAMIN D3) 50 MCG (2000 UT) CAPS Take 1 capsule by mouth daily 90 capsule 3     No current facility-administered medications for this visit. Review of Systems   Constitutional: Negative. Negative for fever. Eyes: Negative. Endocrine: Negative. Musculoskeletal: Positive for back pain and gait problem. Objective:  General Appearance:  Uncomfortable and in pain. Vital signs: (most recent): Blood pressure 126/89, pulse 83, height 5' 8\" (1.727 m), weight 205 lb (93 kg), SpO2 98 %. Vital signs are normal.  No fever. HEENT: Normal HEENT exam.    Lungs:  Normal effort. Heart: Normal rate. Neurological: Patient is alert and oriented to person, place and time.          Assessment & Plan   -62year-old woman with complicated medical history  She moved from [place] to [place] recently  She had 2 previous back surgeries most recent was in Feb 2020  Postoperatively she developed severe low back left leg pain left leg weakness gait instability and urinary incontinence  Unfortunately no work-up has been done by her surgeon or anybody else  Physical therapy have advised to complete MRI imaging before proceeding any therapy  MRI has been denied by her insurance  X-ray did not show any hardware failure  She is currently taking Lyrica  She is able to ambulate only with a walker  Her balance and strength have deteriorated over last 18 months  I truly believe that the she needs EMG nerve testing neurological consultation MRI lumbar spine with and without contrast neurosurgical evaluation and then extensive rehab if no surgical pathology is found  Medication Refill: lyrica    1. Chronic bilateral low back pain with left-sided sciatica    2. History of lumbar fusion    3. Urinary incontinence, unspecified type        My concerns that her care is not being moving forward in the right direction and over last 3 months we have not been able to even obtain an MRI  I explained it in detail  She is supposed to call to schedule MRI or bring the letter of denial so I can write an appeal letter for MRI denial  I do have not received any denial for MRI in our office  Will provide refill for Lyrica      No orders of the defined types were placed in this encounter. Orders Placed This Encounter   Medications    LYRICA 100 MG capsule     Sig: Take 1 capsule by mouth 2 times daily for 30 days.      Dispense:  60 capsule     Refill:  1     Patient is states she cannot tolerate generic brand of Lyrica            Electronically signed by Julio C Hensley MD on 11/30/2021 at 3:48 PM

## 2021-12-14 ENCOUNTER — HOSPITAL ENCOUNTER (OUTPATIENT)
Dept: ULTRASOUND IMAGING | Age: 61
Discharge: HOME | End: 2021-12-16
Payer: COMMERCIAL

## 2021-12-14 ENCOUNTER — HOSPITAL ENCOUNTER (OUTPATIENT)
Dept: MAMMOGRAPHY | Age: 61
Discharge: HOME | End: 2021-12-16
Payer: COMMERCIAL

## 2021-12-14 DIAGNOSIS — R92.8 ABNORMAL MAMMOGRAM: ICD-10-CM

## 2021-12-14 DIAGNOSIS — R92.8 ABNORMAL MAMMOGRAM OF RIGHT BREAST: Primary | ICD-10-CM

## 2021-12-14 PROCEDURE — 76642 ULTRASOUND BREAST LIMITED: CPT

## 2021-12-14 PROCEDURE — G0279 TOMOSYNTHESIS, MAMMO: HCPCS

## 2021-12-21 ENCOUNTER — HOSPITAL ENCOUNTER (OUTPATIENT)
Dept: NEUROLOGY | Age: 61
Discharge: HOME | End: 2021-12-21
Payer: COMMERCIAL

## 2021-12-21 ENCOUNTER — CLINICAL DOCUMENTATION (OUTPATIENT)
Dept: PHYSICAL MEDICINE AND REHAB | Age: 61
End: 2021-12-21

## 2021-12-27 ENCOUNTER — HOSPITAL ENCOUNTER (OUTPATIENT)
Dept: MAMMOGRAPHY | Age: 61
Discharge: HOME | End: 2021-12-29
Payer: COMMERCIAL

## 2021-12-27 ENCOUNTER — HOSPITAL ENCOUNTER (OUTPATIENT)
Dept: ULTRASOUND IMAGING | Age: 61
Discharge: HOME | End: 2021-12-29
Payer: COMMERCIAL

## 2021-12-27 DIAGNOSIS — N63.10 MASS OF BREAST, RIGHT: ICD-10-CM

## 2021-12-27 DIAGNOSIS — Z98.890 STATUS POST BREAST BIOPSY: ICD-10-CM

## 2021-12-27 PROCEDURE — 76642 ULTRASOUND BREAST LIMITED: CPT

## 2022-01-10 ENCOUNTER — OFFICE VISIT (OUTPATIENT)
Dept: PAIN MANAGEMENT | Age: 62
End: 2022-01-10
Payer: COMMERCIAL

## 2022-01-10 VITALS
OXYGEN SATURATION: 97 % | WEIGHT: 200 LBS | HEIGHT: 68 IN | HEART RATE: 87 BPM | DIASTOLIC BLOOD PRESSURE: 78 MMHG | BODY MASS INDEX: 30.31 KG/M2 | SYSTOLIC BLOOD PRESSURE: 117 MMHG

## 2022-01-10 DIAGNOSIS — M54.42 CHRONIC BILATERAL LOW BACK PAIN WITH LEFT-SIDED SCIATICA: ICD-10-CM

## 2022-01-10 DIAGNOSIS — Z98.1 HISTORY OF LUMBAR FUSION: ICD-10-CM

## 2022-01-10 DIAGNOSIS — G89.29 CHRONIC BILATERAL LOW BACK PAIN WITH LEFT-SIDED SCIATICA: ICD-10-CM

## 2022-01-10 PROCEDURE — 99213 OFFICE O/P EST LOW 20 MIN: CPT | Performed by: NURSE PRACTITIONER

## 2022-01-10 ASSESSMENT — ENCOUNTER SYMPTOMS
BOWEL INCONTINENCE: 0
BACK PAIN: 1

## 2022-01-10 NOTE — PROGRESS NOTES
Chief Complaint   Patient presents with    Medication Refill     lyrica       [facility]     62year-old woman with 2 previous back surgeries most recent was in Feb 2020  Postoperatively she developed severe low back left leg pain left leg weakness gait instability and urinary incontinence  Unfortunately no work-up has been done by her surgeon or anybody else  Physical therapy was ordered in Nov but pt was advised to complete MRI imaging before proceeding any therapy d/t red flags  Pt thinks MRI was denied by her insurance - did not receive any communication though  X-ray was done and did not show any hardware failure  She is currently taking Lyrica  She is able to ambulate only with a walker  Her balance and strength have deteriorated over last 18 months pt needs w/c for outside of house and walker in house      Back Pain  This is a chronic problem. The problem occurs constantly. The problem is unchanged. The pain is present in the lumbar spine. The quality of the pain is described as aching, burning and shooting. The pain radiates to the left foot. The pain is at a severity of 4/10. The pain is mild. The pain is worse during the day. The symptoms are aggravated by standing and twisting. Associated symptoms include bladder incontinence, leg pain, numbness, paresthesias and weakness. Pertinent negatives include no bowel incontinence. Risk factors include obesity, poor posture, menopause, sedentary lifestyle and lack of exercise. She has tried analgesics and bed rest for the symptoms. The treatment provided mild relief.      Chief Complaint:    Medication Refill: Lyrica    Pain score Today:  4  Adverse effects (Constipation / Nausea / Sedation / sexual Dysfunction / others) : No   Mood: fair  Sleep pattern and quality: good   Activity level: poor     Pill count Today:n/a  Last dose taken 1/10/21  OARRS report reviewed today: yes  ER/Hospitalizations/PCP visit related to pain since last visit:no   Any legal problems e.g. DUI etc.:No  Satisfied with current management: Yes        Lab Results   Component Value Date    LABA1C 5.6 08/28/2021     Lab Results   Component Value Date     08/28/2021       Past Medical History, Past Surgical History, Social History, Allergies and Medications reviewed and updated in EPIC as indicated    Family History reviewed and is noncontributory. Patient denies any new neurological symptoms. No bowel or bladder incontinence, no weakness, and no falling. Pill count: appropriate    Morphine equivalent:            Past Medical History:   Diagnosis Date    Chronic prescription opiate use 9/8/2021    History of back surgery     L3-4 Fusion in 1980 and unk back surgery 2020    Neuropathy     Urinary incontinence        Past Surgical History:   Procedure Laterality Date    BACK SURGERY      L3-4 Fusion in 1980 and unk back surgery 2020    CHOLECYSTECTOMY, LAPAROSCOPIC      HYSTERECTOMY, TOTAL ABDOMINAL         Allergies   Allergen Reactions    Cymbalta [Duloxetine Hcl]      vomiting         Current Outpatient Medications:     LYRICA 100 MG capsule, Take 1 capsule by mouth 2 times daily for 30 days. , Disp: 60 capsule, Rfl: 1    Cyanocobalamin (B-12) 1000 MCG SUBL, Place 1 tablet under the tongue daily, Disp: 90 tablet, Rfl: 5    Cholecalciferol (VITAMIN D3) 50 MCG (2000 UT) CAPS, Take 1 capsule by mouth daily, Disp: 90 capsule, Rfl: 3    Family History   Problem Relation Age of Onset    Breast Cancer Mother 79       Social History     Socioeconomic History    Marital status:      Spouse name: Not on file    Number of children: Not on file    Years of education: Not on file    Highest education level: Not on file   Occupational History    Not on file   Tobacco Use    Smoking status: Never Smoker    Smokeless tobacco: Never Used   Substance and Sexual Activity    Alcohol use: Not Currently    Drug use: Not Currently    Sexual activity: Not on file   Other Topics Concern    Not on file   Social History Narrative    Not on file     Social Determinants of Health     Financial Resource Strain: Medium Risk    Difficulty of Paying Living Expenses: Somewhat hard   Food Insecurity: No Food Insecurity    Worried About Running Out of Food in the Last Year: Never true    Baljit of Food in the Last Year: Never true   Transportation Needs:     Lack of Transportation (Medical): Not on file    Lack of Transportation (Non-Medical): Not on file   Physical Activity:     Days of Exercise per Week: Not on file    Minutes of Exercise per Session: Not on file   Stress:     Feeling of Stress : Not on file   Social Connections:     Frequency of Communication with Friends and Family: Not on file    Frequency of Social Gatherings with Friends and Family: Not on file    Attends Muslim Services: Not on file    Active Member of [address] [place] [address] or Organizations: Not on file    Attends Club or Organization Meetings: Not on file    Marital Status: Not on file   Intimate Partner Violence:     Fear of Current or Ex-Partner: Not on file    Emotionally Abused: Not on file    Physically Abused: Not on file    Sexually Abused: Not on file   Housing Stability:     Unable to Pay for Housing in the Last Year: Not on file    Number of [place] in the Last Year: Not on file    Unstable Housing in the Last Year: Not on file       Review of Systems:  Review of Systems   Musculoskeletal: Positive for back pain, muscle weakness and myalgias. Gastrointestinal: Negative for bowel incontinence. Genitourinary: Positive for bladder incontinence. Neurological: Positive for numbness, paresthesias and weakness. Physical Exam:  /78   Pulse 87   Ht 5' 8\" (1.727 m)   Wt 200 lb (90.7 kg)   SpO2 97%   BMI 30.41 kg/m²     Physical Exam  Cardiovascular:      Rate and Rhythm: Normal rate.    Pulmonary:      Effort: Pulmonary effort is normal.   Musculoskeletal:         General: Normal range of

## 2022-02-01 ENCOUNTER — OFFICE VISIT (OUTPATIENT)
Dept: FAMILY MEDICINE CLINIC | Age: 62
End: 2022-02-01
Payer: COMMERCIAL

## 2022-02-01 VITALS
WEIGHT: 201.8 LBS | SYSTOLIC BLOOD PRESSURE: 99 MMHG | OXYGEN SATURATION: 95 % | TEMPERATURE: 97.4 F | HEIGHT: 68 IN | DIASTOLIC BLOOD PRESSURE: 67 MMHG | HEART RATE: 90 BPM | BODY MASS INDEX: 30.58 KG/M2

## 2022-02-01 DIAGNOSIS — E55.9 VITAMIN D DEFICIENCY: ICD-10-CM

## 2022-02-01 DIAGNOSIS — E78.5 DYSLIPIDEMIA: ICD-10-CM

## 2022-02-01 DIAGNOSIS — E53.8 B12 DEFICIENCY: ICD-10-CM

## 2022-02-01 DIAGNOSIS — R32 URINARY INCONTINENCE, UNSPECIFIED TYPE: ICD-10-CM

## 2022-02-01 DIAGNOSIS — M54.42 CHRONIC BILATERAL LOW BACK PAIN WITH LEFT-SIDED SCIATICA: Primary | ICD-10-CM

## 2022-02-01 DIAGNOSIS — G89.29 CHRONIC BILATERAL LOW BACK PAIN WITH LEFT-SIDED SCIATICA: Primary | ICD-10-CM

## 2022-02-01 PROCEDURE — 99214 OFFICE O/P EST MOD 30 MIN: CPT | Performed by: FAMILY MEDICINE

## 2022-02-01 ASSESSMENT — ENCOUNTER SYMPTOMS
ANAL BLEEDING: 0
DIARRHEA: 0
COLOR CHANGE: 0
ABDOMINAL DISTENTION: 0
RECTAL PAIN: 0
COUGH: 0
SHORTNESS OF BREATH: 0
EYE REDNESS: 0
NAUSEA: 0
CONSTIPATION: 0
CHEST TIGHTNESS: 0
EYE PAIN: 0
ABDOMINAL PAIN: 0
VOMITING: 0
EYE DISCHARGE: 0
BLOOD IN STOOL: 0
SINUS PRESSURE: 0
VOICE CHANGE: 0
BACK PAIN: 0
TROUBLE SWALLOWING: 0

## 2022-02-01 ASSESSMENT — PATIENT HEALTH QUESTIONNAIRE - PHQ9
SUM OF ALL RESPONSES TO PHQ QUESTIONS 1-9: 0
SUM OF ALL RESPONSES TO PHQ QUESTIONS 1-9: 0
1. LITTLE INTEREST OR PLEASURE IN DOING THINGS: 0
SUM OF ALL RESPONSES TO PHQ9 QUESTIONS 1 & 2: 0
SUM OF ALL RESPONSES TO PHQ QUESTIONS 1-9: 0
2. FEELING DOWN, DEPRESSED OR HOPELESS: 0
SUM OF ALL RESPONSES TO PHQ QUESTIONS 1-9: 0

## 2022-02-01 NOTE — PROGRESS NOTES
Subjective:      Patient ID: Leti Bear is a 62 y.o. female. States she cannot have MRI done as insurance is not covering and for this reason PT will not see her due to red flag complaints. States bowel incontinence when she eats beans- states when she avoids beans her bowels are WNL. States did not start the ditropan yest - but not sure if she wants to try this yet as in am she is continent of urine but at night she used to get up to use the RR and last many months she is not. Bladder is over full and she states when she gets up in am she is incontinent- this may be due to an overfull bladder- advised to put an alarm for 200 am and use restroom every night so bladder is not overfull in am - may take of \" red Flag\" sx No: 2 as well. In which case she can start PT as the reason she is not being . States mood has been ok, sleep is ok. States lyrica makes her sleepy but helps with pain. When she gets up in am and sitting up has a HA and top of head hurts. and hears a whooshing sound in her ears. States takes OTC tylenol as needed for her headache. States last meal is 5-600 pm and gets up around 700 am, advised late night snack to see if this will help with HA. States leg strength is getting better modestly better, she has not been falling as much- last fall was over a month ago and was able to get  before that was a few months ago- will research effexor and wellbutrin and let me know if she wants to start this. Review of Systems   Constitutional: Negative for activity change, appetite change and fatigue. HENT: Negative for dental problem, ear pain, hearing loss, postnasal drip, sinus pressure, sneezing, tinnitus, trouble swallowing and voice change. Eyes: Negative for pain, discharge, redness and visual disturbance. Respiratory: Negative for cough, chest tightness and shortness of breath. Cardiovascular: Negative for chest pain, palpitations and leg swelling.    Gastrointestinal: Negative for abdominal distention, abdominal pain, anal bleeding, blood in stool, constipation, diarrhea, nausea, rectal pain and vomiting. Endocrine: Negative for cold intolerance, heat intolerance, polydipsia, polyphagia and polyuria. Genitourinary: Negative for decreased urine volume, difficulty urinating, dyspareunia, dysuria, enuresis, flank pain, frequency, genital sores, hematuria, menstrual problem, pelvic pain, urgency, vaginal bleeding and vaginal discharge. Musculoskeletal: Positive for gait problem and myalgias. Negative for arthralgias, back pain, joint swelling, neck pain and neck stiffness. Skin: Negative for color change, pallor and rash. Allergic/Immunologic: Negative for environmental allergies, food allergies and immunocompromised state. Neurological: Positive for headaches. Negative for dizziness, tremors, seizures, syncope, facial asymmetry, speech difficulty, weakness, light-headedness and numbness. Hematological: Negative for adenopathy. Does not bruise/bleed easily. Psychiatric/Behavioral: Negative for agitation, behavioral problems, confusion, decreased concentration, sleep disturbance and suicidal ideas. The patient is not nervous/anxious. Objective:   Physical Exam  Constitutional:       General: She is not in acute distress. HENT:      Head: Normocephalic and atraumatic. Right Ear: External ear normal.      Left Ear: External ear normal.      Nose: Nose normal.      Mouth/Throat:      Mouth: Mucous membranes are moist.   Eyes:      Conjunctiva/sclera: Conjunctivae normal.      Pupils: Pupils are equal, round, and reactive to light. Neck:      Thyroid: No thyromegaly. Trachea: No tracheal deviation. Cardiovascular:      Rate and Rhythm: Normal rate and regular rhythm. Pulses: Normal pulses. Heart sounds: Normal heart sounds. No murmur heard. No friction rub. No gallop. Pulmonary:      Effort: Pulmonary effort is normal. No respiratory distress. Breath sounds: No stridor. No wheezing or rales. Chest:      Chest wall: No tenderness. Abdominal:      General: Bowel sounds are normal. There is no distension. Palpations: Abdomen is soft. Tenderness: There is no abdominal tenderness. There is no rebound. Musculoskeletal:         General: Normal range of motion. Cervical back: Normal range of motion. Lymphadenopathy:      Cervical: No cervical adenopathy. Skin:     General: Skin is warm. Coloration: Skin is not pale. Findings: No erythema or rash. Neurological:      General: No focal deficit present. Mental Status: She is alert and oriented to person, place, and time. Mental status is at baseline. Cranial Nerves: No cranial nerve deficit. Motor: No abnormal muscle tone. Deep Tendon Reflexes: Reflexes normal.   Psychiatric:         Mood and Affect: Mood normal.         Behavior: Behavior normal.         Thought Content: Thought content normal.         Judgment: Judgment normal.          Vitals:    02/01/22 1315   BP: 99/67   Pulse: 90   Temp: 97.4 °F (36.3 °C)   SpO2: 95%         Assessment:      Diagnosis Orders   1. Chronic bilateral low back pain with left-sided sciatica     2. Dyslipidemia     3. Vitamin D deficiency     4. B12 deficiency     5. Urinary incontinence, unspecified type           Plan:     No orders of the defined types were placed in this encounter. Outpatient Encounter Medications as of 2/1/2022   Medication Sig Dispense Refill    LYRICA 100 MG capsule Take 1 capsule by mouth 2 times daily for 30 days.  60 capsule 1    Cyanocobalamin (B-12) 1000 MCG SUBL Place 1 tablet under the tongue daily 90 tablet 5    Cholecalciferol (VITAMIN D3) 50 MCG (2000 UT) CAPS Take 1 capsule by mouth daily 90 capsule 3     No facility-administered encounter medications on file as of 2/1/2022.     15 minutes spent with patient counseling/educating on acute/chronic medical health problems, medications, along with treatment options. Reviewed multiple labs/imaging/medications with patient during this time. Following Diet discussion/education was done on healthy diet. In addition Exercise plan and depression screen were discussed. Recent labs/imaging were discussed and reviewed with patient.

## 2022-02-10 ENCOUNTER — OFFICE VISIT (OUTPATIENT)
Dept: PAIN MANAGEMENT | Age: 62
End: 2022-02-10
Payer: COMMERCIAL

## 2022-02-10 VITALS
HEART RATE: 77 BPM | BODY MASS INDEX: 30.28 KG/M2 | HEIGHT: 68 IN | DIASTOLIC BLOOD PRESSURE: 69 MMHG | WEIGHT: 199.8 LBS | OXYGEN SATURATION: 98 % | SYSTOLIC BLOOD PRESSURE: 102 MMHG

## 2022-02-10 DIAGNOSIS — G89.29 CHRONIC BILATERAL LOW BACK PAIN WITH LEFT-SIDED SCIATICA: Primary | ICD-10-CM

## 2022-02-10 DIAGNOSIS — Z98.1 HISTORY OF LUMBAR FUSION: ICD-10-CM

## 2022-02-10 DIAGNOSIS — M54.42 CHRONIC BILATERAL LOW BACK PAIN WITH LEFT-SIDED SCIATICA: Primary | ICD-10-CM

## 2022-02-10 PROCEDURE — 99213 OFFICE O/P EST LOW 20 MIN: CPT | Performed by: NURSE PRACTITIONER

## 2022-02-10 ASSESSMENT — ENCOUNTER SYMPTOMS
BACK PAIN: 1
CONSTIPATION: 0
COUGH: 0
SHORTNESS OF BREATH: 0
RESPIRATORY NEGATIVE: 1

## 2022-02-10 NOTE — PROGRESS NOTES
Chief Complaint   Patient presents with    Results     MRI    Back Pain         PMH     59-year-old woman with 2 previous back surgeries most recent was in Feb 2020  Postoperatively she developed severe low back left leg pain left leg weakness gait instability and urinary incontinence when asleep. Unfortunately no work-up has been done by her surgeon or anybody else  Physical therapy was ordered in Nov but pt was advised to complete MRI imaging before proceeding any therapy d/t red flags  Pt thinks MRI was denied by her insurance - did not receive any communication though  X-ray was done and did not show any hardware failure  She is currently taking Lyrica  She is able to ambulate only with a walker  Her balance and strength have deteriorated over last 18 months pt needs w/c for outside of house and walker in house  Pt did not complete EMG d/t not being able to tolerate the test.       HPI:   Chief Complaint:  Medication Refill: Lyrica refill not needed at this time     Pain score Today:  3  Adverse effects (Constipation / Nausea / Sedation / sexual Dysfunction / others) :no   Mood: fair  Sleep pattern and quality: excellent  Activity level: fair    Pill count Today:  Last dose taken  02/10/2022  OARRS report reviewed today: yes  ER/Hospitalizations/PCP visit related to pain since last visit: no   Any legal problems e.g. DUI etc.:No  Satisfied with current management: Yes    Opioid Contract:none on file  Last Urine Dug screen dated:none on file     Lab Results   Component Value Date    LABA1C 5.6 08/28/2021     Lab Results   Component Value Date     08/28/2021       Past Medical History, Past Surgical History, Social History, Allergies and Medications reviewed and updated in EPIC as indicated    Family History reviewed and is noncontributory.              Past Medical History:   Diagnosis Date    Chronic prescription opiate use 9/8/2021    History of back surgery     L3-4 Fusion in 1980 and unk back surgery 2020    Neuropathy     Urinary incontinence        Past Surgical History:   Procedure Laterality Date    BACK SURGERY      L3-4 Fusion in 1980 and unk back surgery 2020    CHOLECYSTECTOMY, LAPAROSCOPIC      HYSTERECTOMY, TOTAL ABDOMINAL         Allergies   Allergen Reactions    Cymbalta [Duloxetine Hcl]      vomiting         Current Outpatient Medications:     LYRICA 100 MG capsule, Take 1 capsule by mouth 2 times daily for 30 days. , Disp: 60 capsule, Rfl: 1    Cyanocobalamin (B-12) 1000 MCG SUBL, Place 1 tablet under the tongue daily, Disp: 90 tablet, Rfl: 5    Cholecalciferol (VITAMIN D3) 50 MCG (2000 UT) CAPS, Take 1 capsule by mouth daily, Disp: 90 capsule, Rfl: 3    Family History   Problem Relation Age of Onset    Breast Cancer Mother 79       Social History     Socioeconomic History    Marital status:      Spouse name: Not on file    Number of children: Not on file    Years of education: Not on file    Highest education level: Not on file   Occupational History    Not on file   Tobacco Use    Smoking status: Never Smoker    Smokeless tobacco: Never Used   Substance and Sexual Activity    Alcohol use: Not Currently    Drug use: Not Currently    Sexual activity: Not on file   Other Topics Concern    Not on file   Social History Narrative    Not on file     Social Determinants of Health     Financial Resource Strain: Medium Risk    Difficulty of Paying Living Expenses: Somewhat hard   Food Insecurity: No Food Insecurity    Worried About Running Out of Food in the Last Year: Never true    Baljit of Food in the Last Year: Never true   Transportation Needs:     Lack of Transportation (Medical): Not on file    Lack of Transportation (Non-Medical):  Not on file   Physical Activity:     Days of Exercise per Week: Not on file    Minutes of Exercise per Session: Not on file   Stress:     Feeling of Stress : Not on file   Social Connections:     Frequency of Communication with Friends and Family: Not on file    Frequency of Social Gatherings with Friends and Family: Not on file    Attends Druze Services: Not on file    Active Member of Clubs or Organizations: Not on file    Attends Club or Organization Meetings: Not on file    Marital Status: Not on file   Intimate Partner Violence:     Fear of Current or Ex-Partner: Not on file    Emotionally Abused: Not on file    Physically Abused: Not on file    Sexually Abused: Not on file   Housing Stability:     Unable to Pay for Housing in the Last Year: Not on file    Number of [place] in the Last Year: Not on file    Unstable Housing in the Last Year: Not on file       Review of Systems:  Review of Systems   Constitutional: Negative. Negative for chills and fever. Cardiovascular: Negative for chest pain. Respiratory: Negative. Negative for cough and shortness of breath. Musculoskeletal: Positive for back pain. Gastrointestinal: Negative for constipation. Neurological: Positive for headaches. Physical Exam:  /69   Pulse 77   Ht 5' 8\" (1.727 m)   Wt 199 lb 12.8 oz (90.6 kg)   SpO2 98%   BMI 30.38 kg/m²     Physical Exam  Cardiovascular:      Rate and Rhythm: Normal rate. Pulmonary:      Effort: Pulmonary effort is normal.   Musculoskeletal:         General: Normal range of motion. Skin:     General: Skin is warm and dry. Neurological:      Mental Status: She is alert and oriented to person, place, and time. Assessment:  Problem List Items Addressed This Visit     History of lumbar fusion    Chronic bilateral low back pain with left-sided sciatica - Primary             Treatment Plan:    Patient states MRI was again denied - does not know why  Will have our office each out to insurance company for denial and have MD  complete peer review if needed.

## 2022-02-24 DIAGNOSIS — G89.29 CHRONIC BILATERAL LOW BACK PAIN WITH LEFT-SIDED SCIATICA: ICD-10-CM

## 2022-02-24 DIAGNOSIS — Z98.1 HISTORY OF LUMBAR FUSION: ICD-10-CM

## 2022-02-24 DIAGNOSIS — M54.42 CHRONIC BILATERAL LOW BACK PAIN WITH LEFT-SIDED SCIATICA: ICD-10-CM

## 2022-03-28 DIAGNOSIS — M54.42 CHRONIC BILATERAL LOW BACK PAIN WITH LEFT-SIDED SCIATICA: ICD-10-CM

## 2022-03-28 DIAGNOSIS — G89.29 CHRONIC BILATERAL LOW BACK PAIN WITH LEFT-SIDED SCIATICA: ICD-10-CM

## 2022-03-28 DIAGNOSIS — Z98.1 HISTORY OF LUMBAR FUSION: ICD-10-CM

## 2022-03-29 NOTE — TELEPHONE ENCOUNTER
Pt did not need this she is now in [place] and is trying to have her new doctor out there call this in for her but if she has an emergency she may need this called into a pharmacy in [facility]

## 2022-05-17 ENCOUNTER — TELEPHONE (OUTPATIENT)
Dept: GASTROENTEROLOGY | Age: 62
End: 2022-05-17

## 2022-06-14 NOTE — DISCHARGE SUMMARY
[] [facility]        Outpatient Physical                Therapy       [address]       Phone: [phone]       Fax: [phone] [] [facility] at [address] [facility]       Phone: [phone]       Fax: [phone] [] [facility]     [address]     Phone: [phone]     Fax:  [phone]     Physical Therapy Discharge Note    Date: 2022      Patient: Luisa Reaves  : 1960  MRN: [id number]    Physician:   Karoline Myrick MD                                         Insurance: self pay  Medical Diagnosis:   R26.9 (ICD-10-CM) - Gait difficulty   M54.50, G89.29 (ICD-10-CM) - Chronic low back pain, unspecified back pain laterality, unspecified whether sciatica present      Onset Date: unsure,                                  Next 's appt: NA     Total visits attedned: 1      Discharge Status:     Pt failed to make additional appointments for therapy. Pt. Is now discharged. Electronically signed by: Gerrie Duane, PT    If you have any questions or concerns, please don't hesitate to call.   Thank you for your referral.

## 2023-09-26 NOTE — TELEPHONE ENCOUNTER
1st attempt; called and LVM for patient regarding colon screen referral.    2nd attempt; mailed colon screen letter to patient's home address. Regular Diet - No restrictions